# Patient Record
Sex: FEMALE | URBAN - METROPOLITAN AREA
[De-identification: names, ages, dates, MRNs, and addresses within clinical notes are randomized per-mention and may not be internally consistent; named-entity substitution may affect disease eponyms.]

---

## 2022-09-01 ENCOUNTER — APPOINTMENT (RX ONLY)
Dept: URBAN - METROPOLITAN AREA CLINIC 108 | Facility: CLINIC | Age: 67
Setting detail: DERMATOLOGY
End: 2022-09-01

## 2022-09-01 DIAGNOSIS — L81.4 OTHER MELANIN HYPERPIGMENTATION: ICD-10-CM

## 2022-09-01 DIAGNOSIS — L91.0 HYPERTROPHIC SCAR: ICD-10-CM

## 2022-09-01 DIAGNOSIS — L81.5 LEUKODERMA, NOT ELSEWHERE CLASSIFIED: ICD-10-CM

## 2022-09-01 PROCEDURE — ? COUNSELING

## 2022-09-01 PROCEDURE — 11900 INJECT SKIN LESIONS </W 7: CPT

## 2022-09-01 PROCEDURE — ? PRESCRIPTION MEDICATION MANAGEMENT

## 2022-09-01 PROCEDURE — ? FULL BODY SKIN EXAM - DECLINED

## 2022-09-01 PROCEDURE — 99203 OFFICE O/P NEW LOW 30 MIN: CPT | Mod: 25

## 2022-09-01 PROCEDURE — ? ADDITIONAL NOTES

## 2022-09-01 PROCEDURE — ? INTRALESIONAL KENALOG

## 2022-09-01 ASSESSMENT — LOCATION SIMPLE DESCRIPTION DERM: LOCATION SIMPLE: CHEST

## 2022-09-01 ASSESSMENT — LOCATION ZONE DERM: LOCATION ZONE: TRUNK

## 2022-09-01 ASSESSMENT — LOCATION DETAILED DESCRIPTION DERM
LOCATION DETAILED: UPPER STERNUM
LOCATION DETAILED: RIGHT MEDIAL SUPERIOR CHEST
LOCATION DETAILED: LEFT MEDIAL SUPERIOR CHEST

## 2022-09-01 NOTE — PROCEDURE: PRESCRIPTION MEDICATION MANAGEMENT
Detail Level: Zone
Render In Strict Bullet Format?: No
Plan: Biopsy proven keloid.\\nDiscussed the possibility of hypopigmentation around the area of injection.\\nDiscussed possibility of recurrence of keloid.

## 2022-09-01 NOTE — HPI: SKIN LESION
What Type Of Note Output Would You Prefer (Optional)?: Standard Output
How Severe Is Your Skin Lesion?: moderate
Is This A New Presentation, Or A Follow-Up?: Skin Lesion
Additional History: Pt states she had site biopsied twice at another clinic and came back benign.

## 2022-11-03 ENCOUNTER — APPOINTMENT (RX ONLY)
Dept: URBAN - METROPOLITAN AREA CLINIC 108 | Facility: CLINIC | Age: 67
Setting detail: DERMATOLOGY
End: 2022-11-03

## 2022-11-03 DIAGNOSIS — L91.0 HYPERTROPHIC SCAR: ICD-10-CM

## 2022-11-03 PROCEDURE — ? PRESCRIPTION MEDICATION MANAGEMENT

## 2022-11-03 PROCEDURE — ? INTRALESIONAL KENALOG

## 2022-11-03 PROCEDURE — 11900 INJECT SKIN LESIONS </W 7: CPT

## 2022-11-03 PROCEDURE — ? FULL BODY SKIN EXAM - DECLINED

## 2022-11-03 PROCEDURE — ? ADDITIONAL NOTES

## 2022-11-03 PROCEDURE — ? COUNSELING

## 2022-11-03 ASSESSMENT — LOCATION ZONE DERM: LOCATION ZONE: TRUNK

## 2022-11-03 ASSESSMENT — LOCATION SIMPLE DESCRIPTION DERM: LOCATION SIMPLE: CHEST

## 2022-11-03 ASSESSMENT — LOCATION DETAILED DESCRIPTION DERM: LOCATION DETAILED: UPPER STERNUM

## 2022-11-03 NOTE — PROCEDURE: PRESCRIPTION MEDICATION MANAGEMENT
Detail Level: Zone
Render In Strict Bullet Format?: No
Plan: Informed patient that she may need up to three treatments of ILK.

## 2022-12-15 ENCOUNTER — APPOINTMENT (RX ONLY)
Dept: URBAN - METROPOLITAN AREA CLINIC 108 | Facility: CLINIC | Age: 67
Setting detail: DERMATOLOGY
End: 2022-12-15

## 2022-12-15 DIAGNOSIS — L91.0 HYPERTROPHIC SCAR: ICD-10-CM | Status: STABLE

## 2022-12-15 PROCEDURE — ? ADDITIONAL NOTES

## 2022-12-15 PROCEDURE — ? TREATMENT REGIMEN

## 2022-12-15 PROCEDURE — 99213 OFFICE O/P EST LOW 20 MIN: CPT

## 2022-12-15 PROCEDURE — ? COUNSELING

## 2022-12-15 PROCEDURE — ? FULL BODY SKIN EXAM - DECLINED

## 2022-12-15 ASSESSMENT — LOCATION DETAILED DESCRIPTION DERM: LOCATION DETAILED: UPPER STERNUM

## 2022-12-15 ASSESSMENT — LOCATION SIMPLE DESCRIPTION DERM: LOCATION SIMPLE: CHEST

## 2022-12-15 ASSESSMENT — LOCATION ZONE DERM: LOCATION ZONE: TRUNK

## 2022-12-15 NOTE — PROCEDURE: TREATMENT REGIMEN
Plan: Keloid s/p biopsy\\nS/p ILK with improvement, at goal no further treatment needed
Detail Level: Detailed

## 2023-05-18 ENCOUNTER — APPOINTMENT (RX ONLY)
Dept: URBAN - METROPOLITAN AREA CLINIC 108 | Facility: CLINIC | Age: 68
Setting detail: DERMATOLOGY
End: 2023-05-18

## 2023-05-18 DIAGNOSIS — L21.8 OTHER SEBORRHEIC DERMATITIS: ICD-10-CM | Status: INADEQUATELY CONTROLLED

## 2023-05-18 DIAGNOSIS — L82.1 OTHER SEBORRHEIC KERATOSIS: ICD-10-CM | Status: STABLE

## 2023-05-18 PROCEDURE — ? PRESCRIPTION

## 2023-05-18 PROCEDURE — ? ADDITIONAL NOTES

## 2023-05-18 PROCEDURE — ? COUNSELING

## 2023-05-18 PROCEDURE — ? FULL BODY SKIN EXAM - DECLINED

## 2023-05-18 PROCEDURE — 99213 OFFICE O/P EST LOW 20 MIN: CPT

## 2023-05-18 RX ORDER — CLOBETASOL PROPIONATE 0.5 MG/ML
SOLUTION TOPICAL BID
Qty: 50 | Refills: 2 | Status: ERX | COMMUNITY
Start: 2023-05-18

## 2023-05-18 RX ADMIN — CLOBETASOL PROPIONATE: 0.5 SOLUTION TOPICAL at 00:00

## 2023-05-18 ASSESSMENT — LOCATION DETAILED DESCRIPTION DERM
LOCATION DETAILED: RIGHT CENTRAL TEMPLE
LOCATION DETAILED: LEFT CENTRAL FRONTAL SCALP

## 2023-05-18 ASSESSMENT — LOCATION SIMPLE DESCRIPTION DERM
LOCATION SIMPLE: RIGHT TEMPLE
LOCATION SIMPLE: LEFT SCALP

## 2023-05-18 ASSESSMENT — LOCATION ZONE DERM
LOCATION ZONE: FACE
LOCATION ZONE: SCALP

## 2023-05-18 NOTE — PROCEDURE: ADDITIONAL NOTES
Additional Notes: Patient consent was obtained to proceed with the visit and recommended plan of care after discussion of all risks and benefits, including the risks of COVID-19 exposure.
Detail Level: Simple
Additional Notes: Small scaly thin plaque on the left parietal scalp\\nIf no improvement with topical clobetasol, will perform bx next visit\\nRTC 5-6 weeks
Detail Level: Detailed
Render Risk Assessment In Note?: yes

## 2023-06-22 ENCOUNTER — APPOINTMENT (RX ONLY)
Dept: URBAN - METROPOLITAN AREA CLINIC 108 | Facility: CLINIC | Age: 68
Setting detail: DERMATOLOGY
End: 2023-06-22

## 2023-06-22 DIAGNOSIS — L57.0 ACTINIC KERATOSIS: ICD-10-CM | Status: INADEQUATELY CONTROLLED

## 2023-06-22 DIAGNOSIS — D485 NEOPLASM OF UNCERTAIN BEHAVIOR OF SKIN: ICD-10-CM

## 2023-06-22 PROBLEM — D48.5 NEOPLASM OF UNCERTAIN BEHAVIOR OF SKIN: Status: ACTIVE | Noted: 2023-06-22

## 2023-06-22 PROCEDURE — ? COUNSELING

## 2023-06-22 PROCEDURE — 99213 OFFICE O/P EST LOW 20 MIN: CPT

## 2023-06-22 PROCEDURE — ? FULL BODY SKIN EXAM - DECLINED

## 2023-06-22 PROCEDURE — ? DEFER

## 2023-06-22 PROCEDURE — ? ADDITIONAL NOTES

## 2023-06-22 ASSESSMENT — LOCATION DETAILED DESCRIPTION DERM: LOCATION DETAILED: LEFT INFERIOR VERMILION LIP

## 2023-06-22 ASSESSMENT — LOCATION ZONE DERM: LOCATION ZONE: LIP

## 2023-06-22 ASSESSMENT — LOCATION SIMPLE DESCRIPTION DERM: LOCATION SIMPLE: LEFT LIP

## 2023-06-22 NOTE — PROCEDURE: DEFER
X Size Of Lesion In Cm (Optional): 0
Detail Level: Detailed
Introduction Text (Please End With A Colon): The procedure was deferred.
Procedure To Be Performed At Next Visit: Biopsy by shave method
Procedure To Be Performed At Next Visit: Liquid nitrogen

## 2023-06-22 NOTE — PROCEDURE: ADDITIONAL NOTES
Additional Notes: Patient consent was obtained to proceed with the visit and recommended plan of care after discussion of all risks and benefits, including the risks of COVID-19 exposure.
Detail Level: Simple
Detail Level: Detailed
Additional Notes: Psoriasiform appearing plaque left parietal scalp - failed tx with clobetasol. Given that she does not have any other similar areas of involvement and the lack of response to clobetasol, we discussed shave bx to r/o AK vs NMSC. Pt agreeable, will schedule for 1 week as need to obtain PA.
Render Risk Assessment In Note?: yes

## 2023-06-29 ENCOUNTER — APPOINTMENT (RX ONLY)
Dept: URBAN - METROPOLITAN AREA CLINIC 108 | Facility: CLINIC | Age: 68
Setting detail: DERMATOLOGY
End: 2023-06-29

## 2023-06-29 DIAGNOSIS — D485 NEOPLASM OF UNCERTAIN BEHAVIOR OF SKIN: ICD-10-CM

## 2023-06-29 DIAGNOSIS — L57.0 ACTINIC KERATOSIS: ICD-10-CM | Status: INADEQUATELY CONTROLLED

## 2023-06-29 PROBLEM — D48.5 NEOPLASM OF UNCERTAIN BEHAVIOR OF SKIN: Status: ACTIVE | Noted: 2023-06-29

## 2023-06-29 PROCEDURE — ? ADDITIONAL NOTES

## 2023-06-29 PROCEDURE — ? FULL BODY SKIN EXAM - DECLINED

## 2023-06-29 PROCEDURE — 11102 TANGNTL BX SKIN SINGLE LES: CPT

## 2023-06-29 PROCEDURE — ? LIQUID NITROGEN

## 2023-06-29 PROCEDURE — ? COUNSELING

## 2023-06-29 PROCEDURE — ? BIOPSY BY SHAVE METHOD

## 2023-06-29 PROCEDURE — 17000 DESTRUCT PREMALG LESION: CPT | Mod: 59

## 2023-06-29 ASSESSMENT — LOCATION ZONE DERM
LOCATION ZONE: LIP
LOCATION ZONE: SCALP

## 2023-06-29 ASSESSMENT — LOCATION SIMPLE DESCRIPTION DERM
LOCATION SIMPLE: LEFT LIP
LOCATION SIMPLE: SCALP

## 2023-06-29 ASSESSMENT — LOCATION DETAILED DESCRIPTION DERM
LOCATION DETAILED: LEFT INFERIOR VERMILION LIP
LOCATION DETAILED: LEFT CENTRAL PARIETAL SCALP

## 2023-12-11 ENCOUNTER — APPOINTMENT (RX ONLY)
Dept: URBAN - METROPOLITAN AREA CLINIC 108 | Facility: CLINIC | Age: 68
Setting detail: DERMATOLOGY
End: 2023-12-11

## 2023-12-11 DIAGNOSIS — D485 NEOPLASM OF UNCERTAIN BEHAVIOR OF SKIN: ICD-10-CM | Status: INADEQUATELY CONTROLLED

## 2023-12-11 DIAGNOSIS — Z41.9 ENCOUNTER FOR PROCEDURE FOR PURPOSES OTHER THAN REMEDYING HEALTH STATE, UNSPECIFIED: ICD-10-CM

## 2023-12-11 DIAGNOSIS — L82.1 OTHER SEBORRHEIC KERATOSIS: ICD-10-CM | Status: STABLE

## 2023-12-11 PROBLEM — D48.5 NEOPLASM OF UNCERTAIN BEHAVIOR OF SKIN: Status: ACTIVE | Noted: 2023-12-11

## 2023-12-11 PROCEDURE — ? BIOPSY BY SHAVE METHOD

## 2023-12-11 PROCEDURE — 99212 OFFICE O/P EST SF 10 MIN: CPT | Mod: 25

## 2023-12-11 PROCEDURE — ? FULL BODY SKIN EXAM - DECLINED

## 2023-12-11 PROCEDURE — ? COUNSELING

## 2023-12-11 PROCEDURE — 11102 TANGNTL BX SKIN SINGLE LES: CPT

## 2023-12-11 ASSESSMENT — LOCATION ZONE DERM
LOCATION ZONE: SCALP
LOCATION ZONE: FACE

## 2023-12-11 ASSESSMENT — LOCATION DETAILED DESCRIPTION DERM
LOCATION DETAILED: LEFT CENTRAL PARIETAL SCALP
LOCATION DETAILED: RIGHT CENTRAL ZYGOMA

## 2023-12-11 ASSESSMENT — LOCATION SIMPLE DESCRIPTION DERM
LOCATION SIMPLE: RIGHT ZYGOMA
LOCATION SIMPLE: SCALP

## 2024-01-17 ENCOUNTER — APPOINTMENT (RX ONLY)
Dept: URBAN - METROPOLITAN AREA CLINIC 108 | Facility: CLINIC | Age: 69
Setting detail: DERMATOLOGY
End: 2024-01-17

## 2024-01-17 DIAGNOSIS — L82.0 INFLAMED SEBORRHEIC KERATOSIS: ICD-10-CM

## 2024-01-17 PROCEDURE — ? ADDITIONAL NOTES

## 2024-01-17 PROCEDURE — ? DEFER

## 2024-01-17 PROCEDURE — 99213 OFFICE O/P EST LOW 20 MIN: CPT

## 2024-01-17 PROCEDURE — ? COUNSELING

## 2024-01-17 ASSESSMENT — LOCATION DETAILED DESCRIPTION DERM
LOCATION DETAILED: RIGHT SUPERIOR PARIETAL SCALP
LOCATION DETAILED: LEFT SUPERIOR PARIETAL SCALP

## 2024-01-17 ASSESSMENT — LOCATION ZONE DERM: LOCATION ZONE: SCALP

## 2024-01-17 ASSESSMENT — LOCATION SIMPLE DESCRIPTION DERM: LOCATION SIMPLE: SCALP

## 2024-01-17 NOTE — PROCEDURE: ADDITIONAL NOTES
Render Risk Assessment In Note?: no
Detail Level: Simple
Additional Notes: Extensive discussion that biopsy revealed benign lesion x 2, unclear on why wound healing is so poor. We will plan to destroy the lesion with curettage + LN2 and closely monitor

## 2024-01-17 NOTE — PROCEDURE: DEFER
Procedure To Be Performed At Next Visit: Liquid nitrogen
X Size Of Lesion In Cm (Optional): 0
Detail Level: Detailed
Introduction Text (Please End With A Colon): :

## 2024-01-30 ENCOUNTER — APPOINTMENT (RX ONLY)
Dept: URBAN - METROPOLITAN AREA CLINIC 108 | Facility: CLINIC | Age: 69
Setting detail: DERMATOLOGY
End: 2024-01-30

## 2024-01-30 DIAGNOSIS — L82.0 INFLAMED SEBORRHEIC KERATOSIS: ICD-10-CM | Status: INADEQUATELY CONTROLLED

## 2024-01-30 PROCEDURE — ? COUNSELING

## 2024-01-30 PROCEDURE — 17110 DESTRUCTION B9 LES UP TO 14: CPT

## 2024-01-30 PROCEDURE — ? FULL BODY SKIN EXAM - DECLINED

## 2024-01-30 PROCEDURE — ? BENIGN DESTRUCTION

## 2024-01-30 ASSESSMENT — LOCATION ZONE DERM: LOCATION ZONE: SCALP

## 2024-01-30 ASSESSMENT — LOCATION SIMPLE DESCRIPTION DERM: LOCATION SIMPLE: SCALP

## 2024-01-30 ASSESSMENT — LOCATION DETAILED DESCRIPTION DERM: LOCATION DETAILED: RIGHT SUPERIOR PARIETAL SCALP

## 2024-01-30 NOTE — PROCEDURE: BENIGN DESTRUCTION
Anesthesia Type: 1% lidocaine with 1:100,000 epinephrine
Medical Necessity Information: It is in your best interest to select a reason for this procedure from the list below. All of these items fulfill various CMS LCD requirements except the new and changing color options.
Post-Care Instructions: I reviewed with the patient in detail post-care instructions. Patient is to  avoid picking at any of the treated lesions. Pt may apply Vaseline to crusted or scabbing areas.
Anesthesia Volume In Cc: 1.5
Treatment Number (Will Not Render If 0): 0
Detail Level: Detailed
Include Z78.9 (Other Specified Conditions Influencing Health Status) As An Associated Diagnosis?: No
Consent: The patient's consent was obtained including but not limited to risks of crusting, scabbing, blistering, scarring, darker or lighter pigmentary change, recurrence, incomplete removal and infection.
Medical Necessity Clause: This procedure was medically necessary because the lesions that were treated were:

## 2024-02-20 ENCOUNTER — APPOINTMENT (RX ONLY)
Dept: URBAN - METROPOLITAN AREA CLINIC 108 | Facility: CLINIC | Age: 69
Setting detail: DERMATOLOGY
End: 2024-02-20

## 2024-02-20 DIAGNOSIS — L21.8 OTHER SEBORRHEIC DERMATITIS: ICD-10-CM | Status: INADEQUATELY CONTROLLED

## 2024-02-20 DIAGNOSIS — L82.0 INFLAMED SEBORRHEIC KERATOSIS: ICD-10-CM | Status: RESOLVING

## 2024-02-20 PROCEDURE — ? FULL BODY SKIN EXAM - DECLINED

## 2024-02-20 PROCEDURE — ? COUNSELING

## 2024-02-20 PROCEDURE — 99214 OFFICE O/P EST MOD 30 MIN: CPT

## 2024-02-20 PROCEDURE — ? PRESCRIPTION

## 2024-02-20 PROCEDURE — ? PRESCRIPTION MEDICATION MANAGEMENT

## 2024-02-20 RX ORDER — CLOBETASOL PROPIONATE 0.5 MG/ML
SOLUTION TOPICAL
Qty: 50 | Refills: 3 | Status: ERX

## 2024-02-20 RX ORDER — SULFACETAMIDE SODIUM 98 MG/ML
SHAMPOO TOPICAL
Qty: 237 | Refills: 2 | Status: ERX | COMMUNITY
Start: 2024-02-20

## 2024-02-20 RX ADMIN — SULFACETAMIDE SODIUM: 98 SHAMPOO TOPICAL at 00:00

## 2024-02-20 ASSESSMENT — LOCATION DETAILED DESCRIPTION DERM: LOCATION DETAILED: LEFT CENTRAL PARIETAL SCALP

## 2024-02-20 ASSESSMENT — LOCATION ZONE DERM: LOCATION ZONE: SCALP

## 2024-02-20 ASSESSMENT — LOCATION SIMPLE DESCRIPTION DERM: LOCATION SIMPLE: SCALP

## 2024-02-20 NOTE — PROCEDURE: PRESCRIPTION MEDICATION MANAGEMENT
Render In Strict Bullet Format?: Yes
Detail Level: Simple
Initiate Treatment: clobetasol 0.05 % scalp solution, Apply to scalp once a day, for 5 days a week\\n\\nPlexion NS 9.8 % shampoo, Apply to scalp when washing hair, let it sit for 3-5 mins then rinse

## 2024-03-20 ENCOUNTER — APPOINTMENT (RX ONLY)
Dept: URBAN - METROPOLITAN AREA CLINIC 108 | Facility: CLINIC | Age: 69
Setting detail: DERMATOLOGY
End: 2024-03-20

## 2024-03-20 DIAGNOSIS — L82.0 INFLAMED SEBORRHEIC KERATOSIS: ICD-10-CM

## 2024-03-20 DIAGNOSIS — L21.8 OTHER SEBORRHEIC DERMATITIS: ICD-10-CM | Status: IMPROVED

## 2024-03-20 PROCEDURE — ? PRESCRIPTION

## 2024-03-20 PROCEDURE — 99214 OFFICE O/P EST MOD 30 MIN: CPT

## 2024-03-20 PROCEDURE — ? PRESCRIPTION MEDICATION MANAGEMENT

## 2024-03-20 PROCEDURE — ? FULL BODY SKIN EXAM - DECLINED

## 2024-03-20 PROCEDURE — ? COUNSELING

## 2024-03-20 RX ORDER — CLOBETASOL PROPIONATE 0.5 MG/G
OINTMENT TOPICAL
Qty: 60 | Refills: 0 | Status: ERX | COMMUNITY
Start: 2024-03-20

## 2024-03-20 RX ADMIN — CLOBETASOL PROPIONATE: 0.5 OINTMENT TOPICAL at 00:00

## 2024-03-20 ASSESSMENT — LOCATION SIMPLE DESCRIPTION DERM: LOCATION SIMPLE: SCALP

## 2024-03-20 ASSESSMENT — LOCATION DETAILED DESCRIPTION DERM: LOCATION DETAILED: LEFT CENTRAL PARIETAL SCALP

## 2024-03-20 ASSESSMENT — LOCATION ZONE DERM: LOCATION ZONE: SCALP

## 2024-03-20 NOTE — PROCEDURE: PRESCRIPTION MEDICATION MANAGEMENT
Render In Strict Bullet Format?: Yes
Detail Level: Simple
Initiate Treatment: clobetasol 0.05 % topical ointment, Apply twice a day to affected area on the scalp for 5 days of the week
Discontinue Regimen: clobetasol 0.05 % scalp solution\\n\\nPlexion NS 9.8 % shampoo

## 2024-04-18 ENCOUNTER — APPOINTMENT (RX ONLY)
Dept: URBAN - METROPOLITAN AREA CLINIC 108 | Facility: CLINIC | Age: 69
Setting detail: DERMATOLOGY
End: 2024-04-18

## 2024-04-18 DIAGNOSIS — L65.9 NONSCARRING HAIR LOSS, UNSPECIFIED: ICD-10-CM | Status: INADEQUATELY CONTROLLED

## 2024-04-18 DIAGNOSIS — L21.8 OTHER SEBORRHEIC DERMATITIS: ICD-10-CM | Status: INADEQUATELY CONTROLLED

## 2024-04-18 PROCEDURE — ? PRESCRIPTION MEDICATION MANAGEMENT

## 2024-04-18 PROCEDURE — ? FULL BODY SKIN EXAM - DECLINED

## 2024-04-18 PROCEDURE — ? ADDITIONAL NOTES

## 2024-04-18 PROCEDURE — 99214 OFFICE O/P EST MOD 30 MIN: CPT

## 2024-04-18 PROCEDURE — ? COUNSELING

## 2024-04-18 PROCEDURE — ? PRESCRIPTION

## 2024-04-18 RX ORDER — KETOCONAZOLE 20 MG/ML
SHAMPOO, SUSPENSION TOPICAL
Qty: 120 | Refills: 5 | Status: ERX | COMMUNITY
Start: 2024-04-18

## 2024-04-18 RX ORDER — CLOBETASOL PROPIONATE 0.5 MG/G
OINTMENT TOPICAL
Qty: 60 | Refills: 0 | Status: ERX

## 2024-04-18 RX ADMIN — KETOCONAZOLE: 20 SHAMPOO, SUSPENSION TOPICAL at 00:00

## 2024-04-18 ASSESSMENT — LOCATION ZONE DERM
LOCATION ZONE: FACE
LOCATION ZONE: SCALP

## 2024-04-18 ASSESSMENT — LOCATION DETAILED DESCRIPTION DERM
LOCATION DETAILED: LEFT SUPERIOR LATERAL FOREHEAD
LOCATION DETAILED: LEFT CENTRAL PARIETAL SCALP

## 2024-04-18 ASSESSMENT — LOCATION SIMPLE DESCRIPTION DERM
LOCATION SIMPLE: LEFT FOREHEAD
LOCATION SIMPLE: SCALP

## 2024-04-18 NOTE — PROCEDURE: PRESCRIPTION MEDICATION MANAGEMENT
Render In Strict Bullet Format?: Yes
Detail Level: Simple
Initiate Treatment: ketoconazole 2 % shampoo, Wash scalp 3-5 times a week, leave on for 5 minutes then rinse.
Continue Regimen: clobetasol 0.05 % topical ointment, Apply twice a day to affected area on the scalp for 5 days of the week

## 2024-04-18 NOTE — PROCEDURE: ADDITIONAL NOTES
Render Risk Assessment In Note?: no
Additional Notes: Concerned that patient is having signs of LPP, not interested in injections at this time
Detail Level: Simple

## 2024-09-24 ENCOUNTER — APPOINTMENT (RX ONLY)
Dept: URBAN - METROPOLITAN AREA CLINIC 108 | Facility: CLINIC | Age: 69
Setting detail: DERMATOLOGY
End: 2024-09-24

## 2024-09-24 DIAGNOSIS — L29.89 OTHER PRURITUS: ICD-10-CM | Status: INADEQUATELY CONTROLLED

## 2024-09-24 DIAGNOSIS — L29.8 OTHER PRURITUS: ICD-10-CM | Status: INADEQUATELY CONTROLLED

## 2024-09-24 DIAGNOSIS — L82.0 INFLAMED SEBORRHEIC KERATOSIS: ICD-10-CM | Status: INADEQUATELY CONTROLLED

## 2024-09-24 PROCEDURE — 17110 DESTRUCTION B9 LES UP TO 14: CPT

## 2024-09-24 PROCEDURE — ? PRESCRIPTION MEDICATION MANAGEMENT

## 2024-09-24 PROCEDURE — ? COUNSELING

## 2024-09-24 PROCEDURE — ? LIQUID NITROGEN

## 2024-09-24 PROCEDURE — ? PRESCRIPTION

## 2024-09-24 PROCEDURE — 99214 OFFICE O/P EST MOD 30 MIN: CPT | Mod: 25

## 2024-09-24 PROCEDURE — ? FULL BODY SKIN EXAM - DECLINED

## 2024-09-24 RX ORDER — KETOCONAZOLE 20 MG/ML
SHAMPOO, SUSPENSION TOPICAL
Qty: 120 | Refills: 5 | Status: ERX

## 2024-09-24 RX ORDER — CLOBETASOL PROPIONATE 0.5 MG/G
OINTMENT TOPICAL
Qty: 60 | Refills: 5 | Status: ERX

## 2024-09-24 ASSESSMENT — LOCATION DETAILED DESCRIPTION DERM
LOCATION DETAILED: LEFT CENTRAL PARIETAL SCALP
LOCATION DETAILED: RIGHT INFERIOR OCCIPITAL SCALP
LOCATION DETAILED: RIGHT LATERAL FRONTAL SCALP

## 2024-09-24 ASSESSMENT — LOCATION SIMPLE DESCRIPTION DERM
LOCATION SIMPLE: RIGHT SCALP
LOCATION SIMPLE: POSTERIOR SCALP
LOCATION SIMPLE: SCALP

## 2024-09-24 ASSESSMENT — LOCATION ZONE DERM: LOCATION ZONE: SCALP

## 2024-09-24 NOTE — PROCEDURE: LIQUID NITROGEN
Render Note In Bullet Format When Appropriate: No
Spray Paint Text: The liquid nitrogen was applied to the skin utilizing a spray paint frosting technique.
Medical Necessity Clause: This procedure was medically necessary because the lesions that were treated were:
Medical Necessity Information: It is in your best interest to select a reason for this procedure from the list below. All of these items fulfill various CMS LCD requirements except the new and changing color options.
Show Applicator Variable?: Yes
Post-Care Instructions: I reviewed with the patient in detail post-care instructions. Patient is to wear sunprotection, and avoid picking at any of the treated lesions. Pt may apply Vaseline to crusted or scabbing areas.
Consent: The patient's consent was obtained including but not limited to risks of crusting, scabbing, blistering, scarring, darker or lighter pigmentary change, recurrence, incomplete removal and infection.
Detail Level: Detailed

## 2024-09-24 NOTE — PROCEDURE: PRESCRIPTION MEDICATION MANAGEMENT
Render In Strict Bullet Format?: Yes
Detail Level: Simple
Continue Regimen: clobetasol 0.05 % topical ointment, Apply twice a day to affected area on the scalp every day\\nketoconazole 2 % shampoo

## 2024-10-23 ENCOUNTER — APPOINTMENT (RX ONLY)
Dept: URBAN - METROPOLITAN AREA CLINIC 108 | Facility: CLINIC | Age: 69
Setting detail: DERMATOLOGY
End: 2024-10-23

## 2024-10-23 DIAGNOSIS — R20.2 PARESTHESIA OF SKIN: ICD-10-CM | Status: INADEQUATELY CONTROLLED

## 2024-10-23 PROCEDURE — ? PRESCRIPTION MEDICATION MANAGEMENT

## 2024-10-23 PROCEDURE — ? PRESCRIPTION

## 2024-10-23 PROCEDURE — ? ADDITIONAL NOTES

## 2024-10-23 PROCEDURE — ? FULL BODY SKIN EXAM - DECLINED

## 2024-10-23 PROCEDURE — ? COUNSELING

## 2024-10-23 PROCEDURE — 99214 OFFICE O/P EST MOD 30 MIN: CPT

## 2024-10-23 RX ORDER — DOXYCYCLINE HYCLATE 100 MG/1
CAPSULE, GELATIN COATED ORAL
Qty: 30 | Refills: 0 | Status: ERX | COMMUNITY
Start: 2024-10-23

## 2024-10-23 RX ADMIN — DOXYCYCLINE HYCLATE: 100 CAPSULE, GELATIN COATED ORAL at 00:00

## 2024-10-23 ASSESSMENT — LOCATION ZONE DERM: LOCATION ZONE: SCALP

## 2024-10-23 ASSESSMENT — LOCATION SIMPLE DESCRIPTION DERM: LOCATION SIMPLE: POSTERIOR SCALP

## 2024-10-23 ASSESSMENT — LOCATION DETAILED DESCRIPTION DERM: LOCATION DETAILED: POSTERIOR MID-PARIETAL SCALP

## 2024-10-23 NOTE — PROCEDURE: PRESCRIPTION MEDICATION MANAGEMENT
Initiate Treatment: doxycycline hyclate 100 mg capsules, Take 1 capsule once daily. Take with food and a full glass of water. Do not lie down 1 hour after taking.
Render In Strict Bullet Format?: Yes
Detail Level: Simple
Continue Regimen: : clobetasol 0.05 % topical ointment, Apply 3-5 times a week to affected area on the scalp

## 2024-10-23 NOTE — PROCEDURE: MIPS QUALITY
Quality 226: Preventive Care And Screening: Tobacco Use: Screening And Cessation Intervention: Patient screened for tobacco use and is an ex/non-smoker
Detail Level: Detailed
Quality 130: Documentation Of Current Medications In The Medical Record: Current Medications Documented
Quality 431: Preventive Care And Screening: Unhealthy Alcohol Use - Screening: Patient not identified as an unhealthy alcohol user when screened for unhealthy alcohol use using a systematic screening method
Hemostasis: Electrocautery

## 2024-10-23 NOTE — PROCEDURE: ADDITIONAL NOTES
Render Risk Assessment In Note?: no
Detail Level: Simple
Additional Notes: Offered ILK injections, patient declined today

## 2024-12-04 ENCOUNTER — APPOINTMENT (OUTPATIENT)
Dept: URBAN - METROPOLITAN AREA CLINIC 108 | Facility: CLINIC | Age: 69
Setting detail: DERMATOLOGY
End: 2024-12-04

## 2024-12-04 DIAGNOSIS — R20.2 PARESTHESIA OF SKIN: ICD-10-CM

## 2024-12-04 PROCEDURE — ? PRESCRIPTION MEDICATION MANAGEMENT

## 2024-12-04 PROCEDURE — 99214 OFFICE O/P EST MOD 30 MIN: CPT

## 2024-12-04 PROCEDURE — ? FULL BODY SKIN EXAM - DECLINED

## 2024-12-04 PROCEDURE — ? COUNSELING

## 2024-12-04 ASSESSMENT — LOCATION ZONE DERM: LOCATION ZONE: SCALP

## 2024-12-04 ASSESSMENT — LOCATION SIMPLE DESCRIPTION DERM: LOCATION SIMPLE: POSTERIOR SCALP

## 2024-12-04 ASSESSMENT — LOCATION DETAILED DESCRIPTION DERM: LOCATION DETAILED: POSTERIOR MID-PARIETAL SCALP

## 2024-12-04 NOTE — PROCEDURE: PRESCRIPTION MEDICATION MANAGEMENT
Discontinue Regimen: doxycycline hyclate 100 mg capsules\\n\\nClobetasol ointment
Plan: Patient is using OTC castor oil and is happy with results and will continue with otc treatments
Render In Strict Bullet Format?: Yes
Detail Level: Simple